# Patient Record
Sex: FEMALE | Race: WHITE | NOT HISPANIC OR LATINO | Employment: FULL TIME | URBAN - NONMETROPOLITAN AREA
[De-identification: names, ages, dates, MRNs, and addresses within clinical notes are randomized per-mention and may not be internally consistent; named-entity substitution may affect disease eponyms.]

---

## 2017-02-11 ENCOUNTER — APPOINTMENT (OUTPATIENT)
Dept: MAMMOGRAPHY | Facility: HOSPITAL | Age: 42
End: 2017-02-11

## 2017-02-17 ENCOUNTER — HOSPITAL ENCOUNTER (OUTPATIENT)
Dept: MAMMOGRAPHY | Facility: HOSPITAL | Age: 42
Discharge: HOME | End: 2017-02-17
Admitting: OBSTETRICS & GYNECOLOGY

## 2017-02-17 DIAGNOSIS — Z12.31 VISIT FOR SCREENING MAMMOGRAM: ICD-10-CM

## 2017-02-17 PROCEDURE — 77063 BREAST TOMOSYNTHESIS BI: CPT | Performed by: RADIOLOGY

## 2017-02-17 PROCEDURE — 77067 SCR MAMMO BI INCL CAD: CPT | Performed by: RADIOLOGY

## 2017-02-17 PROCEDURE — 77063 BREAST TOMOSYNTHESIS BI: CPT

## 2017-02-17 PROCEDURE — G0202 SCR MAMMO BI INCL CAD: HCPCS

## 2017-02-24 ENCOUNTER — APPOINTMENT (OUTPATIENT)
Dept: MAMMOGRAPHY | Facility: HOSPITAL | Age: 42
End: 2017-02-24

## 2018-04-26 ENCOUNTER — HOSPITAL ENCOUNTER (OUTPATIENT)
Dept: MAMMOGRAPHY | Facility: HOSPITAL | Age: 43
Discharge: HOME | End: 2018-04-26
Admitting: OBSTETRICS & GYNECOLOGY

## 2018-04-26 DIAGNOSIS — Z12.39 BREAST CANCER SCREENING: ICD-10-CM

## 2018-04-26 PROCEDURE — 77063 BREAST TOMOSYNTHESIS BI: CPT | Performed by: RADIOLOGY

## 2018-04-26 PROCEDURE — 77067 SCR MAMMO BI INCL CAD: CPT | Performed by: RADIOLOGY

## 2018-04-26 PROCEDURE — 77063 BREAST TOMOSYNTHESIS BI: CPT

## 2018-04-26 PROCEDURE — 77067 SCR MAMMO BI INCL CAD: CPT

## 2019-02-12 ENCOUNTER — PROCEDURE VISIT (OUTPATIENT)
Dept: OBSTETRICS AND GYNECOLOGY | Facility: CLINIC | Age: 44
End: 2019-02-12

## 2019-02-12 VITALS
SYSTOLIC BLOOD PRESSURE: 128 MMHG | DIASTOLIC BLOOD PRESSURE: 70 MMHG | BODY MASS INDEX: 30.65 KG/M2 | HEIGHT: 63 IN | WEIGHT: 173 LBS

## 2019-02-12 DIAGNOSIS — K59.00 CONSTIPATION, UNSPECIFIED CONSTIPATION TYPE: ICD-10-CM

## 2019-02-12 DIAGNOSIS — N81.6 RECTOCELE: ICD-10-CM

## 2019-02-12 DIAGNOSIS — R39.89 POSSIBLE URINARY TRACT INFECTION: ICD-10-CM

## 2019-02-12 DIAGNOSIS — Z01.411 ENCOUNTER FOR GYNECOLOGICAL EXAMINATION (GENERAL) (ROUTINE) WITH ABNORMAL FINDINGS: Primary | ICD-10-CM

## 2019-02-12 DIAGNOSIS — Z12.39 ENCOUNTER FOR BREAST CANCER SCREENING OTHER THAN MAMMOGRAM: ICD-10-CM

## 2019-02-12 DIAGNOSIS — R32 URINARY INCONTINENCE, UNSPECIFIED TYPE: ICD-10-CM

## 2019-02-12 DIAGNOSIS — N81.11 CYSTOCELE, MIDLINE: ICD-10-CM

## 2019-02-12 PROCEDURE — 99203 OFFICE O/P NEW LOW 30 MIN: CPT | Performed by: OBSTETRICS & GYNECOLOGY

## 2019-02-12 PROCEDURE — 99386 PREV VISIT NEW AGE 40-64: CPT | Performed by: OBSTETRICS & GYNECOLOGY

## 2019-02-12 RX ORDER — RABEPRAZOLE SODIUM 20 MG/1
1 TABLET, DELAYED RELEASE ORAL
COMMUNITY
Start: 2019-01-02

## 2019-02-12 RX ORDER — LORATADINE 10 MG/1
CAPSULE, LIQUID FILLED ORAL
COMMUNITY

## 2019-02-12 RX ORDER — MULTIPLE VITAMINS W/ MINERALS TAB 9MG-400MCG
1 TAB ORAL DAILY
COMMUNITY

## 2019-02-12 NOTE — PROGRESS NOTES
Subjective  Chief Complaint   Patient presents with   • Gynecologic Exam     Patient complains of urinary frequency, pressure and incontinence.      Patient is 43 y.o.  here for her annual examination with multiple complaints.  Patient reports having her last Pap smear in 2018.  Patient had her mammogram done in 2018 in [place].  Patient has complaints of urinary incontinence.  Patient reports the onset of symptoms after her last child.  Patient reports within the last year she has had increase in urinary incontinence.  Patient reports that her underclothing will stay day up most of the time.  Patient has occasional urgency but reports she is able to make it to the restroom most of the time without any incontinence.  Patient does have occasional stress urinary incontinence as well.  Patient also has urinary frequency.  Patient also reports having occasional trouble emptying her bladder.  Patient denies any dysuria, fever, chills, nausea, or back pain.  Patient does have complaints of constipation as well.  Patient reports having infrequent bowel movements.  Patient reports previously having 2-3 bowel movements per day.  Patient reports now she may go several days without a bowel movement.  Patient also reports a change in the stools which are harder and larger.  Patient does have to strain and has difficulty with defecation.  She denies any splinting with bowel movements.  Patient does report having a colonoscopy in 2008 secondary to hemorrhoids.  Patient is currently using condoms for birth control.  Patient previously had been on oral contraceptives but not at present.  Patient reports her menses are every 23-28 days.  Patient reports they will last 5 days with occasional heavy bleeding.  She also reports a history of hematuria since the age of 19.  Patient reports having a urological evaluation at the age of 31.  Patient reports the hematuria is intermittent in nature.    History  Past  "Medical History:   Diagnosis Date   • Abnormal Pap smear of cervix 2003   • Cervical dysplasia 2003   • GERD (gastroesophageal reflux disease)    • HPV (human papilloma virus) infection    • Urinary tract infection      Current Outpatient Medications on File Prior to Visit   Medication Sig Dispense Refill   • Loratadine (CLARITIN) 10 MG capsule Take  by mouth.     • Multiple Vitamins-Minerals (MULTIVITAMIN WITH MINERALS) tablet tablet Take 1 tablet by mouth Daily.     • RABEprazole (ACIPHEX) 20 MG EC tablet Take 1 tablet by mouth.       No current facility-administered medications on file prior to visit.      Allergies   Allergen Reactions   • Nitrofurantoin Hives   • Sulfa Antibiotics Unknown (See Comments)     Patient advised possible rash, took years ago.      Past Surgical History:   Procedure Laterality Date   • GALLBLADDER SURGERY  2005   • GYNECOLOGIC CRYOSURGERY  2003     Family History   Problem Relation Age of Onset   • Hypertension Other    • Breast cancer Neg Hx      Social History     Socioeconomic History   • Marital status:      Spouse name: Not on file   • Number of children: Not on file   • Years of education: Not on file   • Highest education level: Not on file   Tobacco Use   • Smoking status: Never Smoker   • Smokeless tobacco: Never Used   Substance and Sexual Activity   • Alcohol use: No     Frequency: Never   • Drug use: No   • Sexual activity: Yes     Partners: Male     Birth control/protection: Condom     Review of Systems  All systems were reviewed and negative except for:  Genitourinary: postivie for  difficulty/inability to void, frequency, urinary incontinence and urinary urgency     Objective  Vitals:    02/12/19 0919   BP: 128/70   Weight: 78.5 kg (173 lb)   Height: 160 cm (63\")     Physical Exam:  General Appearance: alert, appears stated age and cooperative  Head: normocephalic, without obvious abnormality and atraumatic  Eyes: lids and lashes normal, conjunctivae and sclerae " normal, no icterus, no pallor, corneas clear and PERRLA  Ears: ears appear intact with no abnormalities noted  Nose: nares normal, septum midline, mucosa normal and no drainage  Neck: suppple, trachea midline and no thyromegaly  Lungs: clear to auscultation, respirations regular, respirations even and respirations unlabored  Heart: regular rhythm and normal rate, normal S1, S2, no murmur, gallop, or rubs and no click  Breasts: Examined in supine position  Symmetric without masses or skin dimpling  Nipples normal without inversion, lesions or discharge  There are no palpable axillary nodes  Abdomen: normal bowel sounds, no masses, no hepatomegaly, no splenomegaly, soft non-tender, no guarding and no rebound tenderness  Pelvic: Clinical staff was present for exam  External genitalia:  normal appearance of the external genitalia including Bartholin's and Reg[place]'s glands.  :  urethral meatus normal;  Vaginal:  normal pink mucosa without prolapse or lesions.  Cervix:  normal appearance.  Uterus:  normal size, shape and consistency.  Adnexa:  non palpable bilaterally.  Rectal:  digital rectal exam not performed; anus visually normal appearing.  Cystocele GRADE 1  Rectocele GRADE 2  Pap smear done and specimen sent using Thin-Prep technique  Extremities: moves extremities well, no edema, no cyanosis and no redness  Skin: no bleeding, bruising or rash and no lesions noted  Lymph Nodes: no palpable adenopathy  Neuro: CN II-X grossly intact; sensation intact  Psych: normal mood and affect, oriented to person, time and place, thought content organized and appropriate judgment  Lab Review   No data reviewed    Imaging   No data reviewed    Assessment/Plan  Problem List Items Addressed This Visit     None      Visit Diagnoses     Encounter for gynecological examination (general) (routine) with abnormal findings    -  Primary  Pap was done today.  If she does not receive the results of the Pap within 2 weeks  time, she was  instructed to call to find out the results.  I explained to Riddhi that the recommendations for Pap smear interval in a low risk patient has lengthened to 3 years time if cytology alone normal or  5 years time if both cytology and HPV testing were normal.  I encouraged her to be seen yearly for a full physical exam including breast and pelvic exam even during the off years when PAP's will not be performed.    Relevant Orders    Mammo Screening Digital Tomosynthesis Bilateral With CAD    Pap IG, Rfx HPV ASCU    Encounter for breast cancer screening other than mammogram  It is recommended per ACOG, for women at average risk to start annual mammogram screening at the age of 40 until the age of 75 and an individualized decision be made for women after age 75.  She was encouraged to continue getting yearly mammograms.  She should report any palpable breast lump(s) or skin changes regardless of mammographic findings.  I explained to Riddhi that notification regarding her mammogram results will come from the center performing the study.  Our office will not be routinely calling with mammogram results.  It is her responsibility to make sure that the results from the mammogram are communicated to her by the breast center.  If she has any questions about the results, she is welcome to call our office anytime.  Pt to schedule.    Riddhi was counseled regarding having clinical breast exams and breast self-awareness.  Women aged 29-39 years of age should have clinical breast exams every 1-3 years and yearly aged 40 and older.  The patient was counseled regarding breast self-awareness focusing on having a sense of what is normal for her breasts so that she can tell if there are changes.  Even small changes should be reported to provider.  Possible urinary tract infection      Will send urine for CCUA, C&S; see ua today.    Relevant Orders    Urine Culture - Urine, Urine, Clean Catch    Urinalysis With Microscopic - Urine, Clean  Catch    Cystocele, midline      Pt with pelvic organ prolapse.  Risks factors discussed including increasing parity, advancing age, obesity, history of hysterectomy, and chronic constipation.  Signs and symptoms discussed.  Treatment options discussed as well including expectant management, pelvic floor muscle exercises, pessary, medication, and surgery.  Changes in lifestyle discussed including no heavy lifting or straining, keep stools soft, and avoid increasing pressure in the area of prolapse.  Pelvic floor exercises were also discussed and reviewed.  Benefits and complications of wearing a pessary was discussed.  Estrogen therapy to strengthen the supporting structures and possibly ameliorate symptoms was discussed.  Surgical intervention with risks, complications, and benefits was also discussed.    Rectocele      Discussed changes in lifestyle as well as addressing her constipation.  Instructions and precautions given.  Pt to call if worsening of symptoms or if desires any intervention.    Urinary incontinence, unspecified type      Riddhi Virgenlliams was informed that urinary incontinence is the involuntary leaking of urine caused by a wide variety of factors.  It is a common condition in women that increases with age.  The patient was informed that there are different types of urinary incontinence to include stress urinary incontinence, urgency urinary incontinence, and mixed urinary incontinence as well as other subtypes.  The patient was informed that the correct diagnosis is important in the evaluation and treatment of her leaking.  The basic evaluation includes patient's history and physical examination.  A urinary tract infection needs to be ruled out as well as urinary retention and overflow incontinence.  Sometimes additional specialized testing needs to be performed such as urodynamic testing and cystoscopy.  The various treatment options were discussed ranging from conservative  treatment to  include pelvic floor exercises and modifications in lifestyle, pessary, pharmacologic, to surgical.  Locally administered estrogen may be of some benefit in women with vaginal atrophy. Because treatment options vary by incontinence type and effectiveness, it is important to determine the etiology and severity of symptoms.  Will send urine as noted above.  Plan pending results.    Constipation, unspecified constipation type      Discussed various treatment options for constipation.  Pt informed in use of stool softners.  Pt also informed for need of plenty of water with 64 oz of water recommended daily.  Pt also informed regarding trial with use of Miralax 1 cap full (17 grams) mixed with 1 tablespoon of Citracel daily in 8 oz of water.  Pt to follow this with another 8 oz of water.  Instructions and precautions given.  Pt to call if no improvement in symptoms in 2-3 weeks.  Pt informed if no improvement in symptoms to call and may need GI evaluation.  Pt in agreement with plan.          Follow up as discussed/scheduled  This note was electronically signed.  Elma John M.D.

## 2019-02-14 LAB
APPEARANCE UR: CLEAR
BACTERIA #/AREA URNS HPF: ABNORMAL /HPF
BACTERIA UR CULT: NORMAL
BACTERIA UR CULT: NORMAL
BILIRUB UR QL STRIP: NEGATIVE
CASTS URNS MICRO: ABNORMAL
COLOR UR: YELLOW
EPI CELLS #/AREA URNS HPF: ABNORMAL /HPF
GLUCOSE UR QL: NEGATIVE
HGB UR QL STRIP: (no result)
KETONES UR QL STRIP: NEGATIVE
LEUKOCYTE ESTERASE UR QL STRIP: NEGATIVE
NITRITE UR QL STRIP: NEGATIVE
PH UR STRIP: 6.5 [PH] (ref 5–8)
PROT UR QL STRIP: NEGATIVE
RBC #/AREA URNS HPF: ABNORMAL /HPF
SP GR UR: 1.02 (ref 1–1.03)
UROBILINOGEN UR STRIP-MCNC: (no result) MG/DL
WBC #/AREA URNS HPF: ABNORMAL /HPF

## 2019-02-18 DIAGNOSIS — Z01.411 ENCOUNTER FOR GYNECOLOGICAL EXAMINATION (GENERAL) (ROUTINE) WITH ABNORMAL FINDINGS: ICD-10-CM

## 2019-04-29 ENCOUNTER — HOSPITAL ENCOUNTER (OUTPATIENT)
Dept: MAMMOGRAPHY | Facility: HOSPITAL | Age: 44
Discharge: HOME | End: 2019-04-29
Admitting: OBSTETRICS & GYNECOLOGY

## 2019-04-29 DIAGNOSIS — Z01.411 ENCOUNTER FOR GYNECOLOGICAL EXAMINATION (GENERAL) (ROUTINE) WITH ABNORMAL FINDINGS: ICD-10-CM

## 2019-04-29 PROCEDURE — 77063 BREAST TOMOSYNTHESIS BI: CPT | Performed by: RADIOLOGY

## 2019-04-29 PROCEDURE — 77067 SCR MAMMO BI INCL CAD: CPT

## 2019-04-29 PROCEDURE — 77067 SCR MAMMO BI INCL CAD: CPT | Performed by: RADIOLOGY

## 2019-04-29 PROCEDURE — 77063 BREAST TOMOSYNTHESIS BI: CPT

## 2019-12-12 ENCOUNTER — OFFICE VISIT (OUTPATIENT)
Dept: OBSTETRICS AND GYNECOLOGY | Facility: CLINIC | Age: 44
End: 2019-12-12

## 2019-12-12 VITALS
BODY MASS INDEX: 31.89 KG/M2 | DIASTOLIC BLOOD PRESSURE: 60 MMHG | WEIGHT: 180 LBS | SYSTOLIC BLOOD PRESSURE: 130 MMHG | HEIGHT: 63 IN

## 2019-12-12 DIAGNOSIS — N81.6 RECTOCELE: Primary | ICD-10-CM

## 2019-12-12 DIAGNOSIS — N81.11 CYSTOCELE, MIDLINE: ICD-10-CM

## 2019-12-12 DIAGNOSIS — K59.00 CONSTIPATION, UNSPECIFIED CONSTIPATION TYPE: ICD-10-CM

## 2019-12-12 PROCEDURE — 99214 OFFICE O/P EST MOD 30 MIN: CPT | Performed by: OBSTETRICS & GYNECOLOGY

## 2019-12-13 NOTE — PROGRESS NOTES
Subjective  Chief Complaint   Patient presents with   • Follow-up     Rectocele      Patient is 43 y.o.  here for follow-up of her rectocele and cystocele.  The patient reports trying her Citrucel and MiraLAX as previously discussed.  The patient reports the combination caused her stools to be too loose and too frequent.  Patient has been using the MiraLAX only as needed.  Patient also reports having marked cramping associated with the combination.  Patient reports that she continues to have prolapse of tissue outside of her anus.  Patient did have a colonoscopy with Dr. Torres in May of this year.  Patient reports there were no abnormal findings.  Patient reports she had no internal hemorrhoids.  Patient does report however continuing to have irritation around the perianal area.  Patient has not had any bleeding.  Patient is not used any medications.  Patient reports no changes with her bladder.  Patient denies any changes in her health otherwise.  The patient does have a history of irritable bowel syndrome.  Patient reports she has alternating loose stools with constipation.  Patient has not been on any treatment or management for her irritable bowel.    History  Past Medical History:   Diagnosis Date   • Abnormal Pap smear of cervix    • Cervical dysplasia    • GERD (gastroesophageal reflux disease)    • HPV (human papilloma virus) infection    • Urinary tract infection      Current Outpatient Medications on File Prior to Visit   Medication Sig Dispense Refill   • Loratadine (CLARITIN) 10 MG capsule Take  by mouth.     • Multiple Vitamins-Minerals (MULTIVITAMIN WITH MINERALS) tablet tablet Take 1 tablet by mouth Daily.     • RABEprazole (ACIPHEX) 20 MG EC tablet Take 1 tablet by mouth.       No current facility-administered medications on file prior to visit.      Allergies   Allergen Reactions   • Nitrofurantoin Hives   • Sulfa Antibiotics Unknown (See Comments)     Patient advised possible rash,  "took years ago.      Past Surgical History:   Procedure Laterality Date   • GALLBLADDER SURGERY  2005   • GYNECOLOGIC CRYOSURGERY  2003     Family History   Problem Relation Age of Onset   • Hypertension Other    • Breast cancer Neg Hx      Social History     Socioeconomic History   • Marital status:      Spouse name: Not on file   • Number of children: Not on file   • Years of education: Not on file   • Highest education level: Not on file   Tobacco Use   • Smoking status: Never Smoker   • Smokeless tobacco: Never Used   Substance and Sexual Activity   • Alcohol use: No     Frequency: Never   • Drug use: No   • Sexual activity: Yes     Partners: Male     Birth control/protection: Condom     Review of Systems  All systems were reviewed and negative except for:  Gastrointestinal: postitive for  constipation and prolapse     Objective  Vitals:    12/12/19 1318   BP: 130/60   Weight: 81.6 kg (180 lb)   Height: 160 cm (63\")     Physical Exam:  General Appearance: alert, appears stated age and cooperative  Head: normocephalic, without obvious abnormality and atraumatic  Eyes: lids and lashes normal, conjunctivae and sclerae normal, no icterus, no pallor, corneas clear and PERRLA  Ears: ears appear intact with no abnormalities noted  Nose: nares normal, septum midline, mucosa normal and no drainage  Neck: suppple, trachea midline and no thyromegaly  Lungs: clear to auscultation, respirations regular, respirations even and respirations unlabored  Heart: regular rhythm and normal rate, normal S1, S2, no murmur, gallop, or rubs and no click  Breasts: Not performed.  Abdomen: normal bowel sounds, no masses, no hepatomegaly, no splenomegaly, soft non-tender, no guarding and no rebound tenderness  Pelvic: Clinical staff was present for exam  External genitalia:  normal appearance of the external genitalia including Bartholin's and [place]'s glands.  :  urethral meatus normal;  Vaginal:  normal pink mucosa without " prolapse or lesions.  Cervix:  normal appearance.  Uterus:  normal size, shape and consistency.  Adnexa:  normal bimanual exam of the adnexa.  Rectal:  anus visually normal appearing. recto-vaginal exam unremarkable and confirms findings;  Cystocele GRADE 1  Rectocele GRADE 2  Extremities: moves extremities well, no edema, no cyanosis and no redness  Skin: no bleeding, bruising or rash and no lesions noted  Lymph Nodes: no palpable adenopathy  Neuro: CN II-X grossly intact; sensation intact  Psych: normal mood and affect, oriented to person, time and place, thought content organized and appropriate judgment  Lab Review   No data reviewed    Imaging   No data reviewed    Decision to Obtain Medical Records  No    Summary of Medical Records  No    Assessment/Plan  Problem List Items Addressed This Visit     None      Visit Diagnoses     Rectocele    -  Primary Unchanged  Patient's rectocele is unchanged.  I had a long detailed extensive discussion with the patient today regarding her findings.  The rectocele is unchanged.  I have discussed with the patient the possibility of rectal prolapse.  There are no hemorrhoids noted on examination today.  The patient is instructed to continue to keep her stools loose.  Patient is also instructed in Keagle exercises.  I have also discussed with patient surgical intervention if worsening of her symptoms.  Plan expectant management at present.    Cystocele, midline    Unchanged  No changes noted on examination.  The patient is instructed to keep her stools soft with no straining.  Patient is also encouraged in no heavy lifting.  Patient is also encouraged to continue Keagle exercises.    Constipation, unspecified constipation type    Unchanged  Patient continues to have episodes of constipation as noted.  I discussed with the patient a trial of MiraLAX daily for 1 week.  Patient is instructed to do this early in the morning.  Patient is also instructed to increase her water intake.   If the patient is unable to tolerate this then she is instructed to try Citrucel or Metamucil on a daily basis as well.  The patient is instructed if continued symptoms then to follow-up with her gastroenterologist for further evaluation and treatment.      Total time spent today with Riddhi  was 30 minutes (level 4).  Greater than 50% of the time was spent face to face coordinating and planning care, answering her questions and counseling regarding pathophysiology of her presenting problem along with plans for any diagnositc work-up and treatment.  Follow up as discussed/scheduled  Note: Speech recognition transcription software may have been used to dictate portions of this document.  An attempt at proofreading has been made though minor errors in transcription may still be present.  This note was electronically signed.  Elma John M.D.

## 2022-12-28 ENCOUNTER — TELEPHONE (OUTPATIENT)
Dept: OBSTETRICS AND GYNECOLOGY | Facility: CLINIC | Age: 47
End: 2022-12-28

## 2022-12-28 NOTE — TELEPHONE ENCOUNTER
Provider: DR. MICHELLE PASTOR  Caller: CARLINE ESTRADA  Relationship to Patient: SELF  Phone Number: [phone]  Reason for Call: BATHOLIN CYSTS  When was the patient last seen:12.12.2019  When did it start: JUST FOUND OUT ABOUT THE CYST  Where is it located: LEFT [facility] 4CM Characteristics of symptom/severity: MODERATE   Timing- Is it constant or intermittent:COMES AND GOES    PATIENT CALLED IN NEEDING TO MAKE AN APPT WITH DR. MICHELLE PASTOR FOR A CYST. PATIENT HAD FOUND OUT FROM [employer]. THEY DID AN US.    PLEASE CALL PATIENT BACK AT THE NUMBER LISTED ABOVE

## 2022-12-29 NOTE — TELEPHONE ENCOUNTER
CALLED PATIENT TO SCHEDULE. HAD TO LEAVE MESSAGE FOR PATIENT TO CALL OFFICE BACK TO SET UP APPOINTMENT.